# Patient Record
Sex: MALE | Race: WHITE | Employment: FULL TIME | URBAN - METROPOLITAN AREA
[De-identification: names, ages, dates, MRNs, and addresses within clinical notes are randomized per-mention and may not be internally consistent; named-entity substitution may affect disease eponyms.]

---

## 2019-05-24 RX ORDER — METHYLPREDNISOLONE 4 MG
4 TABLET, DOSE PACK ORAL SEE ADMIN INSTRUCTIONS
COMMUNITY

## 2019-05-24 RX ORDER — OXYCODONE AND ACETAMINOPHEN 5; 325 MG/1; MG/1
1 TABLET ORAL EVERY 6 HOURS PRN
COMMUNITY

## 2019-05-25 ENCOUNTER — ANESTHESIA EVENT (OUTPATIENT)
Dept: SURGERY | Facility: CLINIC | Age: 44
End: 2019-05-25
Payer: COMMERCIAL

## 2019-05-25 ENCOUNTER — HOSPITAL ENCOUNTER (OUTPATIENT)
Facility: CLINIC | Age: 44
Discharge: HOME | End: 2019-05-25
Attending: DENTIST | Admitting: DENTIST
Payer: COMMERCIAL

## 2019-05-25 ENCOUNTER — ANESTHESIA (OUTPATIENT)
Dept: SURGERY | Facility: CLINIC | Age: 44
End: 2019-05-25
Payer: COMMERCIAL

## 2019-05-25 VITALS
TEMPERATURE: 97.2 F | RESPIRATION RATE: 16 BRPM | WEIGHT: 138.67 LBS | BODY MASS INDEX: 19.85 KG/M2 | DIASTOLIC BLOOD PRESSURE: 103 MMHG | SYSTOLIC BLOOD PRESSURE: 154 MMHG | OXYGEN SATURATION: 100 % | HEART RATE: 50 BPM | HEIGHT: 70 IN

## 2019-05-25 PROCEDURE — 25000128 H RX IP 250 OP 636: Performed by: NURSE ANESTHETIST, CERTIFIED REGISTERED

## 2019-05-25 PROCEDURE — 37000009 ZZH ANESTHESIA TECHNICAL FEE, EACH ADDTL 15 MIN: Performed by: DENTIST

## 2019-05-25 PROCEDURE — 25000125 ZZHC RX 250: Performed by: NURSE ANESTHETIST, CERTIFIED REGISTERED

## 2019-05-25 PROCEDURE — 25800030 ZZH RX IP 258 OP 636: Performed by: NURSE ANESTHETIST, CERTIFIED REGISTERED

## 2019-05-25 PROCEDURE — 71000013 ZZH RECOVERY PHASE 1 LEVEL 1 EA ADDTL HR: Performed by: DENTIST

## 2019-05-25 PROCEDURE — 25000125 ZZHC RX 250: Performed by: DENTIST

## 2019-05-25 PROCEDURE — 25000132 ZZH RX MED GY IP 250 OP 250 PS 637: Performed by: DENTIST

## 2019-05-25 PROCEDURE — 25000566 ZZH SEVOFLURANE, EA 15 MIN: Performed by: DENTIST

## 2019-05-25 PROCEDURE — 71000027 ZZH RECOVERY PHASE 2 EACH 15 MINS: Performed by: DENTIST

## 2019-05-25 PROCEDURE — 71000012 ZZH RECOVERY PHASE 1 LEVEL 1 FIRST HR: Performed by: DENTIST

## 2019-05-25 PROCEDURE — 37000008 ZZH ANESTHESIA TECHNICAL FEE, 1ST 30 MIN: Performed by: DENTIST

## 2019-05-25 PROCEDURE — 40000169 ZZH STATISTIC PRE-PROCEDURE ASSESSMENT I: Performed by: DENTIST

## 2019-05-25 PROCEDURE — 36000093 ZZH SURGERY LEVEL 4 1ST 30 MIN: Performed by: DENTIST

## 2019-05-25 PROCEDURE — 25000128 H RX IP 250 OP 636: Performed by: DENTIST

## 2019-05-25 PROCEDURE — 36000063 ZZH SURGERY LEVEL 4 EA 15 ADDTL MIN: Performed by: DENTIST

## 2019-05-25 PROCEDURE — 27210794 ZZH OR GENERAL SUPPLY STERILE: Performed by: DENTIST

## 2019-05-25 PROCEDURE — 25000128 H RX IP 250 OP 636: Performed by: ANESTHESIOLOGY

## 2019-05-25 PROCEDURE — C1713 ANCHOR/SCREW BN/BN,TIS/BN: HCPCS | Performed by: DENTIST

## 2019-05-25 DEVICE — IMP SCR SYN CORTEX 2.0X06MM W/PLUSDRIVE TI 401.063: Type: IMPLANTABLE DEVICE | Site: MANDIBLE | Status: FUNCTIONAL

## 2019-05-25 RX ORDER — LIDOCAINE HYDROCHLORIDE 20 MG/ML
INJECTION, SOLUTION INFILTRATION; PERINEURAL PRN
Status: DISCONTINUED | OUTPATIENT
Start: 2019-05-25 | End: 2019-05-25

## 2019-05-25 RX ORDER — OXYCODONE HCL 5 MG/5 ML
5 SOLUTION, ORAL ORAL EVERY 4 HOURS PRN
Status: DISCONTINUED | OUTPATIENT
Start: 2019-05-25 | End: 2019-05-25 | Stop reason: HOSPADM

## 2019-05-25 RX ORDER — SODIUM CHLORIDE, SODIUM LACTATE, POTASSIUM CHLORIDE, CALCIUM CHLORIDE 600; 310; 30; 20 MG/100ML; MG/100ML; MG/100ML; MG/100ML
INJECTION, SOLUTION INTRAVENOUS CONTINUOUS
Status: DISCONTINUED | OUTPATIENT
Start: 2019-05-25 | End: 2019-05-25 | Stop reason: HOSPADM

## 2019-05-25 RX ORDER — NEOSTIGMINE METHYLSULFATE 1 MG/ML
VIAL (ML) INJECTION PRN
Status: DISCONTINUED | OUTPATIENT
Start: 2019-05-25 | End: 2019-05-25

## 2019-05-25 RX ORDER — FENTANYL CITRATE 50 UG/ML
25-50 INJECTION, SOLUTION INTRAMUSCULAR; INTRAVENOUS
Status: DISCONTINUED | OUTPATIENT
Start: 2019-05-25 | End: 2019-05-25 | Stop reason: HOSPADM

## 2019-05-25 RX ORDER — FENTANYL CITRATE 50 UG/ML
INJECTION, SOLUTION INTRAMUSCULAR; INTRAVENOUS PRN
Status: DISCONTINUED | OUTPATIENT
Start: 2019-05-25 | End: 2019-05-25

## 2019-05-25 RX ORDER — CEFAZOLIN SODIUM 1 G/3ML
1 INJECTION, POWDER, FOR SOLUTION INTRAMUSCULAR; INTRAVENOUS SEE ADMIN INSTRUCTIONS
Status: DISCONTINUED | OUTPATIENT
Start: 2019-05-25 | End: 2019-05-25 | Stop reason: HOSPADM

## 2019-05-25 RX ORDER — GLYCOPYRROLATE 0.2 MG/ML
INJECTION, SOLUTION INTRAMUSCULAR; INTRAVENOUS PRN
Status: DISCONTINUED | OUTPATIENT
Start: 2019-05-25 | End: 2019-05-25

## 2019-05-25 RX ORDER — ONDANSETRON 4 MG/1
4 TABLET, ORALLY DISINTEGRATING ORAL EVERY 30 MIN PRN
Status: DISCONTINUED | OUTPATIENT
Start: 2019-05-25 | End: 2019-05-25 | Stop reason: HOSPADM

## 2019-05-25 RX ORDER — SODIUM CHLORIDE, SODIUM LACTATE, POTASSIUM CHLORIDE, CALCIUM CHLORIDE 600; 310; 30; 20 MG/100ML; MG/100ML; MG/100ML; MG/100ML
INJECTION, SOLUTION INTRAVENOUS CONTINUOUS PRN
Status: DISCONTINUED | OUTPATIENT
Start: 2019-05-25 | End: 2019-05-25

## 2019-05-25 RX ORDER — ONDANSETRON 2 MG/ML
INJECTION INTRAMUSCULAR; INTRAVENOUS PRN
Status: DISCONTINUED | OUTPATIENT
Start: 2019-05-25 | End: 2019-05-25

## 2019-05-25 RX ORDER — DEXAMETHASONE SODIUM PHOSPHATE 4 MG/ML
INJECTION, SOLUTION INTRA-ARTICULAR; INTRALESIONAL; INTRAMUSCULAR; INTRAVENOUS; SOFT TISSUE PRN
Status: DISCONTINUED | OUTPATIENT
Start: 2019-05-25 | End: 2019-05-25

## 2019-05-25 RX ORDER — PROPOFOL 10 MG/ML
INJECTION, EMULSION INTRAVENOUS PRN
Status: DISCONTINUED | OUTPATIENT
Start: 2019-05-25 | End: 2019-05-25

## 2019-05-25 RX ORDER — VECURONIUM BROMIDE 1 MG/ML
INJECTION, POWDER, LYOPHILIZED, FOR SOLUTION INTRAVENOUS PRN
Status: DISCONTINUED | OUTPATIENT
Start: 2019-05-25 | End: 2019-05-25

## 2019-05-25 RX ORDER — CHLORHEXIDINE GLUCONATE ORAL RINSE 1.2 MG/ML
10 SOLUTION DENTAL ONCE
Status: COMPLETED | OUTPATIENT
Start: 2019-05-25 | End: 2019-05-25

## 2019-05-25 RX ORDER — OXYMETAZOLINE HYDROCHLORIDE 0.05 G/100ML
SPRAY NASAL PRN
Status: DISCONTINUED | OUTPATIENT
Start: 2019-05-25 | End: 2019-05-25

## 2019-05-25 RX ORDER — NALOXONE HYDROCHLORIDE 0.4 MG/ML
.1-.4 INJECTION, SOLUTION INTRAMUSCULAR; INTRAVENOUS; SUBCUTANEOUS
Status: DISCONTINUED | OUTPATIENT
Start: 2019-05-25 | End: 2019-05-25 | Stop reason: HOSPADM

## 2019-05-25 RX ORDER — HYDROMORPHONE HYDROCHLORIDE 1 MG/ML
.3-.5 INJECTION, SOLUTION INTRAMUSCULAR; INTRAVENOUS; SUBCUTANEOUS EVERY 5 MIN PRN
Status: DISCONTINUED | OUTPATIENT
Start: 2019-05-25 | End: 2019-05-25 | Stop reason: HOSPADM

## 2019-05-25 RX ORDER — CEFAZOLIN SODIUM 2 G/100ML
2 INJECTION, SOLUTION INTRAVENOUS
Status: COMPLETED | OUTPATIENT
Start: 2019-05-25 | End: 2019-05-25

## 2019-05-25 RX ORDER — BENZOCAINE/MENTHOL 6 MG-10 MG
LOZENGE MUCOUS MEMBRANE PRN
Status: DISCONTINUED | OUTPATIENT
Start: 2019-05-25 | End: 2019-05-25 | Stop reason: HOSPADM

## 2019-05-25 RX ORDER — BUPIVACAINE HYDROCHLORIDE AND EPINEPHRINE 2.5; 5 MG/ML; UG/ML
INJECTION, SOLUTION INFILTRATION; PERINEURAL PRN
Status: DISCONTINUED | OUTPATIENT
Start: 2019-05-25 | End: 2019-05-25 | Stop reason: HOSPADM

## 2019-05-25 RX ORDER — MAGNESIUM HYDROXIDE 1200 MG/15ML
LIQUID ORAL PRN
Status: DISCONTINUED | OUTPATIENT
Start: 2019-05-25 | End: 2019-05-25 | Stop reason: HOSPADM

## 2019-05-25 RX ORDER — ONDANSETRON 2 MG/ML
4 INJECTION INTRAMUSCULAR; INTRAVENOUS EVERY 30 MIN PRN
Status: DISCONTINUED | OUTPATIENT
Start: 2019-05-25 | End: 2019-05-25 | Stop reason: HOSPADM

## 2019-05-25 RX ADMIN — PROPOFOL 160 MG: 10 INJECTION, EMULSION INTRAVENOUS at 07:43

## 2019-05-25 RX ADMIN — DEXMEDETOMIDINE HYDROCHLORIDE 8 MCG: 100 INJECTION, SOLUTION INTRAVENOUS at 07:59

## 2019-05-25 RX ADMIN — CEFAZOLIN 1 G: 1 INJECTION, POWDER, FOR SOLUTION INTRAMUSCULAR; INTRAVENOUS at 09:50

## 2019-05-25 RX ADMIN — OXYMETAZOLINE HYDROCHLORIDE 2 SPRAY: 5 SPRAY NASAL at 07:40

## 2019-05-25 RX ADMIN — NEOSTIGMINE METHYLSULFATE 3 MG: 1 INJECTION, SOLUTION INTRAVENOUS at 10:04

## 2019-05-25 RX ADMIN — LIDOCAINE HYDROCHLORIDE 70 MG: 20 INJECTION, SOLUTION INFILTRATION; PERINEURAL at 07:43

## 2019-05-25 RX ADMIN — DEXAMETHASONE SODIUM PHOSPHATE 8 MG: 4 INJECTION, SOLUTION INTRA-ARTICULAR; INTRALESIONAL; INTRAMUSCULAR; INTRAVENOUS; SOFT TISSUE at 07:58

## 2019-05-25 RX ADMIN — OXYCODONE HYDROCHLORIDE 5 MG: 5 SOLUTION ORAL at 11:57

## 2019-05-25 RX ADMIN — CEFAZOLIN SODIUM 2 G: 2 INJECTION, SOLUTION INTRAVENOUS at 07:50

## 2019-05-25 RX ADMIN — DEXMEDETOMIDINE HYDROCHLORIDE 12 MCG: 100 INJECTION, SOLUTION INTRAVENOUS at 09:29

## 2019-05-25 RX ADMIN — MIDAZOLAM 2 MG: 1 INJECTION INTRAMUSCULAR; INTRAVENOUS at 07:40

## 2019-05-25 RX ADMIN — FENTANYL CITRATE 50 MCG: 50 INJECTION, SOLUTION INTRAMUSCULAR; INTRAVENOUS at 09:55

## 2019-05-25 RX ADMIN — ROCURONIUM BROMIDE 40 MG: 10 INJECTION INTRAVENOUS at 07:44

## 2019-05-25 RX ADMIN — SODIUM CHLORIDE, POTASSIUM CHLORIDE, SODIUM LACTATE AND CALCIUM CHLORIDE: 600; 310; 30; 20 INJECTION, SOLUTION INTRAVENOUS at 07:40

## 2019-05-25 RX ADMIN — GLYCOPYRROLATE 0.4 MG: 0.2 INJECTION, SOLUTION INTRAMUSCULAR; INTRAVENOUS at 10:04

## 2019-05-25 RX ADMIN — SODIUM CHLORIDE, POTASSIUM CHLORIDE, SODIUM LACTATE AND CALCIUM CHLORIDE: 600; 310; 30; 20 INJECTION, SOLUTION INTRAVENOUS at 08:53

## 2019-05-25 RX ADMIN — ROCURONIUM BROMIDE 10 MG: 10 INJECTION INTRAVENOUS at 08:00

## 2019-05-25 RX ADMIN — FENTANYL CITRATE 100 MCG: 50 INJECTION, SOLUTION INTRAMUSCULAR; INTRAVENOUS at 07:43

## 2019-05-25 RX ADMIN — VECURONIUM BROMIDE 2 MG: 1 INJECTION, POWDER, LYOPHILIZED, FOR SOLUTION INTRAVENOUS at 08:07

## 2019-05-25 RX ADMIN — ONDANSETRON 4 MG: 2 INJECTION INTRAMUSCULAR; INTRAVENOUS at 09:59

## 2019-05-25 RX ADMIN — FENTANYL CITRATE 50 MCG: 50 INJECTION, SOLUTION INTRAMUSCULAR; INTRAVENOUS at 09:09

## 2019-05-25 RX ADMIN — FENTANYL CITRATE 50 MCG: 50 INJECTION, SOLUTION INTRAMUSCULAR; INTRAVENOUS at 11:03

## 2019-05-25 RX ADMIN — FENTANYL CITRATE 50 MCG: 50 INJECTION, SOLUTION INTRAMUSCULAR; INTRAVENOUS at 11:34

## 2019-05-25 RX ADMIN — CHLORHEXIDINE GLUCONATE 15 ML: 1.2 RINSE ORAL at 06:42

## 2019-05-25 ASSESSMENT — MIFFLIN-ST. JEOR: SCORE: 1520.25

## 2019-05-25 NOTE — ANESTHESIA PREPROCEDURE EVALUATION
Anesthesia Pre-Procedure Evaluation    Patient: Mickey Ly   MRN: [id number] : 1975          Preoperative Diagnosis: RIGHT MANDIBLE OPEN FRACTURE    Procedure(s):  OPEN REDUCTION INTERNAL FIXATION, FRACTURE, RIGHT MANDIBLE (SYNTHES KIT)^  EXTRACTION, TOOTH #31    Past Medical History:   Diagnosis Date     Allergic rhinitis      GERD (gastroesophageal reflux disease)     NO MEDICATION     Loss of consciousness (H) 2019    fell of 6 foot ladder; also hurt shoulder, jaw, right sided     History reviewed. No pertinent surgical history.    Anesthesia Evaluation     . Pt has had prior anesthetic. Type: General    No history of anesthetic complications          ROS/MED HX    ENT/Pulmonary:  - neg pulmonary ROS     Neurologic:  - neg neurologic ROS     Cardiovascular:  - neg cardiovascular ROS   (+) hypertension----. : . . . :. .       METS/Exercise Tolerance:  >4 METS   Hematologic:         Musculoskeletal: Comment: Fell off ladder,Facial fracture        GI/Hepatic:  - neg GI/hepatic ROS       Renal/Genitourinary:      (-) renal disease   Endo:         Psychiatric:         Infectious Disease:         Malignancy:         Other:                          Physical Exam  Normal systems: dental    Airway   Mallampati: I  TM distance: >3 FB  Neck ROM: full    Dental     Cardiovascular   Rhythm and rate: regular and normal      Pulmonary    breath sounds clear to auscultation            No results found for: WBC, HGB, HCT, PLT, CRP, SED, NA, POTASSIUM, CHLORIDE, CO2, BUN, CR, GLC, SLADE, PHOS, MAG, ALBUMIN, PROTTOTAL, ALT, AST, GGT, ALKPHOS, BILITOTAL, BILIDIRECT, LIPASE, AMYLASE, DAGOBERTO, PTT, INR, FIBR, TSH, T4, T3, HCG, HCGS, CKTOTAL, CKMB, TROPN    Preop Vitals  BP Readings from Last 3 Encounters:   19 (!) 126/95    Pulse Readings from Last 3 Encounters:   19 60      Resp Readings from Last 3 Encounters:   19 16    SpO2 Readings from Last 3 Encounters:   19 100%      Temp Readings from  "Last 1 Encounters:   05/25/19 36.5  C (97.7  F) (Temporal)    Ht Readings from Last 1 Encounters:   05/25/19 1.778 m (5' 10\")      Wt Readings from Last 1 Encounters:   05/25/19 62.9 kg (138 lb 10.7 oz)    Estimated body mass index is 19.9 kg/m  as calculated from the following:    Height as of this encounter: 1.778 m (5' 10\").    Weight as of this encounter: 62.9 kg (138 lb 10.7 oz).       Anesthesia Plan      History & Physical Review  History and physical reviewed and following examination; no interval change.    ASA Status:  1 .    NPO Status:  > 8 hours    Plan for General, RSI and ETT with Intravenous and Propofol induction. Maintenance will be Balanced.    PONV prophylaxis:  Ondansetron (or other 5HT-3) and Dexamethasone or Solumedrol  Additional equipment: Videolaryngoscope      Postoperative Care  Postoperative pain management:  IV analgesics and Oral pain medications.      Consents  Anesthetic plan, risks, benefits and alternatives discussed with:  Patient..                 Ismael Beauchamp MD  "

## 2019-05-25 NOTE — ANESTHESIA POSTPROCEDURE EVALUATION
Patient: Mickey Ly    Procedure(s):  OPEN REDUCTION INTERNAL FIXATION RIGHT MANDIBLE, EXTRACTION TOOTH # 31,APPLICATION OF MAXILLARY AND MANDIBULAR ARCH BARS  EXTRACTION, TOOTH #31    Diagnosis:RIGHT MANDIBLE OPEN FRACTURE  Diagnosis Additional Information: No value filed.    Anesthesia Type:  General, RSI, ETT    Note:  Anesthesia Post Evaluation    Patient location during evaluation: Bedside  Patient participation: Able to fully participate in evaluation  Level of consciousness: awake and alert  Pain management: adequate  Airway patency: patent  Cardiovascular status: acceptable  Respiratory status: acceptable  Hydration status: acceptable  PONV: none             Last vitals:  Vitals:    05/25/19 1115 05/25/19 1130 05/25/19 1145   BP: (!) 159/99 (!) 160/103 (!) 154/103   Pulse:  50    Resp: 12 11 16   Temp:      SpO2: 96% 99% 100%         Electronically Signed By: Malcom Ramírez MD  May 25, 2019  3:44 PM

## 2019-05-25 NOTE — PRE-PROCEDURE
Oral and Maxillofacial Surgery  Pre-Op Note    OR Date: 5/25/2019    Patient Name: Mickey Ly [id number]   Pre-op History and Physical reviewed.  Pre-Op Diagnosis:   1. Right mandibular body fracture  2. Fractured tooth #31    Planned Procedure:   1. Placement of maxillary and mandibular arch bars  2. Possible open reduction and internal fixation of mandible fracture  3. Extraction of tooth #31    Anesthesia Type: GA via nasal intubation  Allergies: NKDA  Labs: per anesthesia service    Surgeon:   Nader Banks DDS      Consent:  Discussion included risks/benefits/complications including but not limited post-operative pain, bleeding, swelling, infection, hardware failure/malunion, dehiscence of wounds, temporary/permanent paresthesia/anesthesia of CN V3 mental nerve distribution/CN VII (motor to muscles of facial expression), failure to resolve chief complaint, or need for additional procedures. Alternatives discussed.  Patient agrees to procedure. To obtained written and signed consent day of surgery.  Nader Banks DDS

## 2019-05-25 NOTE — BRIEF OP NOTE
[facility]    Brief Operative Note    Pre-operative diagnosis: RIGHT MANDIBLE OPEN FRACTURE  Post-operative diagnosis RIGHT MANDIBLE OPEN FRACTURE  Procedure: Procedure(s):  OPEN REDUCTION INTERNAL FIXATION RIGHT MANDIBLE, EXTRACTION TOOTH # 31,APPLICATION OF MAXILLARY AND MANDIBULAR ARCH BARS  EXTRACTION, TOOTH #31  Surgeon: Surgeon(s) and Role:     * Nader Banks DDS - Primary  Anesthesia: General   Estimated blood loss: 20cc  Drains: None  Specimens: * No specimens in log *  Findings:   fractured tooth #31 and open right mandibular body fracture.  Complications: None.  Implants:    Implant Name Type Inv. Item Serial No.  Lot No. LRB No. Used   8 hole plate     AUTOCLAVE [id number] 42 06  1   IMP SCR SYN CORTEX 2.0X06MM W/PLUSDRIVE .063 [employer] IMP SCR SYN CORTEX 2.0X06MM W/PLUSDRIVE .063  [employer] AUTOCLAVE [id number] 42 06  8   IMP SCR SYN CORTEX 2.0X06MM W/PLUSDRIVE .063 Metallic [employer]ware/[employer] IMP SCR SYN CORTEX 2.0X06MM W/PLUSDRIVE .063  [employer] AUT[employer]AVE [id number] 42 06  1

## 2019-05-25 NOTE — OP NOTE
Procedure Date: 05/25/2019      PREOPERATIVE DIAGNOSES:   1.  Right mandibular body fracture.   2.  Traumatic malocclusion.   3.  Fractured tooth #31.      POSTOPERATIVE DIAGNOSES:   1.  Right mandibular body fracture.   2.  Traumatic malocclusion.   3.  Fractured tooth #31.      PROCEDURES PERFORMED:   1.  Application of maxillary and mandibular arch bars.   2.  Surgical extraction of tooth #31.   3.  Open reduction and internal fixation of the right mandibular body fracture.      ESTIMATED BLOOD LOSS:  20 mL.      FLUIDS:  See anesthesia report.      LOCAL ANESTHESIA:  10 mL of 2% lidocaine with 1:100,000 epinephrine and 5 mL of 0.25% Marcaine with 1:200,000 epinephrine.      DRAINS:  None.      SPECIMENS:  None.      COMPLICATIONS:  None.      INDICATIONS FOR PROCEDURE:  Mickey Ly is an otherwise healthy 43-year-old man who was seen in our outpatient clinic on 05/23/2019 after sustaining a fall from a ladder on 05/21/2019.  This resulted in a right mandibular body fracture as well as fracture of tooth #31 as well as traumatic malocclusion.  After evaluation of the patient clinically and radiographically, the patient was noted to have a grossly displaced right mandibular body fracture, traumatic malocclusion and a fracture nonrestorable tooth #31.  It was recommended the patient be taken to the operating room under general anesthesia for application of maxillary mandibular arch bars, extraction of tooth #31 as well as open reduction internal fixation of the right body fracture.  The risks and benefits of the procedure were thoroughly discussed with the patient and the patient's significant other.  The risks and benefits including but not limited to pain, swelling, infection, need for additional procedures in the future as well as damage to adjacent teeth, damage to adjacent soft tissue and damage to the right inferior alveolar nerve resulting in persistent or temporary paresthesia, anesthesia or dysesthesia.  It  should be noted that the patient did report significant paresthesia of the right V3 distribution preoperatively.  Additional risks include malocclusion, persistent malocclusion, need for additional dental procedures, malunion, fibrous union, and need for additional open reduction internal fixation.  Additionally, we discussed that the potential for a transcutaneous approach placing the marginal mandibular nerve branch at risk, resulting in cranial nerve VII nerve injury resulting in temporary or permanent facial weakness to the right lower lip.  After the risks and benefits were thoroughly discussed with the patient, the patient signed written and verbal consent.      DESCRIPTION OF PROCEDURE:  The patient was met in the preoperative holding area on the day of the surgery.  The risks and benefits were discussed again.  Written and verbal consent was obtained.  The site was marked on the right parasymphysis region.  The patient was then escorted to operating room 32 by the Anesthesia Service.  The patient was placed in supine position.  All standard ASA monitors were applied.  The patient was appropriately padded.  The patient then underwent a smooth IV induction and then was subsequently intubated in the left naris via nasal endotracheal tube in one attempt without complication.  Anesthesia confirmed tube placement by auscultation of the lungs, CO2 monitoring, and presence of fog in the tube by the anesthesia service.  The tube was then fixated and placed in a safe position while placing no pressure on the left naris by the Anesthesia Service.  The head and neck was then prepped to create a sterile field from the infraorbital rims to the supraclavicular region.  The patient was then draped in a sterile fashion to create a sterile field by the scrub tech.  The oral surgeon then left the operating room to perform a sterile scrub and left the patient under the care of the Anesthesia Service.  The oral surgeon then  returned to the operating room to don sterile gloves.  A timeout was then conducted per [facility] preoperative surgical protocol.  A bite block was then placed and then a throat pack was then placed to protect the posterior oropharynx.  First, the maxillary arch bar was then placed, cut and trimmed to adapt from tooth #3 to tooth #14.  Then, with a combination of 24- and 26-gauge wires, the arch bar was then fit and adapted around the teeth.  The wires were then twisted and rosetted and trimmed.  Then the mandibular arch bar was then cut and trimmed and adapted to span teeth #19 through 30.  Then, with a combination of 24- and 26-gauge wires, the arch bar was then fit and adapted and the wires were then twisted, rosetted and cut.  The maxillary mandibular arch bars were noted to be stable.  Attention was then turned to the fractured tooth #31.  The tooth was then gently elevated and noted to be nonmobile.  A surgical handpiece was then used with a 700 bur under copious irrigation to create a thin buccal trough around the buccal aspect of the tooth.  The crown and the distal root were then elevated and removed.  The mesial root was then removed with a 700 bur under copious irrigation to create a buccal trough and then was elevated and removed.  The gross body fracture was then addressed.  A sulcular incision as well as a distal buccal releasing incision distal to tooth #31 was then completed with a #15 blade.  A #9 periosteal elevator was then used to reflect a buccal full thickness mucoperiosteal flap, dissecting to the inferior border of the mandible distal to tooth #31 and just posterior to tooth #29.  No visualization of the mental nerve branch was encountered.  No visualization of the inferior alveolar nerve encountered.  The right mandibular body fracture was then fully visualized and noted to be grossly mobile.  A small crestal buccal bony sequester was noted and removed.  Then, with a combination of  a pickle fork, the mandible was then manipulated and noted to come into appropriate anatomic reduction.  Then, the previously placed bite block was then removed.  The patient was then manipulated into maximum intercuspation and then was intraoperatively placed in maxillomandibular fixation with 18-gauge stainless steel wires.  The occlusion was noted to be stable with maxillary mandibular midlines coincident with no anterior or posterior open bite.  The anatomic reduction of the right mandibular body fracture was then assessed.  The proximal and distal segments were noted to be in good position.  Then, an 8-hole Synthes 1.0 mm ladder plate was then placed along the lateral border of the right mandible.  Mild bending of the anterior portion of the plate was necessary to create flush adaptation to the lateral body of the mandible.  Then, a #15 blade was used to create approximately 1 cm transcutaneous incision approximately 2 cm below the inferior border of the mandible adjacent to the mandibular body fracture.  Blunt dissection with a mosquito was then performed through the underlying subcutaneous tissue, platysma and a trocar was then placed to the tissue and perforation through into the underlying surgical site was completed.  The plate was then reevaluated and held in place.  Then, with a combination of the trocar as well as a surgical handpiece under copious irrigation, an initial osteotomy was performed in 2 plate hole sites.  Then, through the trocar, two 6 mm Synthes screws were then placed along the lateral buccal cortex.  Sufficient buccal bone was noted to be present so as to mitigate any potential risk to the adjacent teeth or inferior alveolar nerve  The plate was noted to be in adequate position.  Then, the additional remaining 6 holes were then predrilled to accommodate additional 6 mm screws under copious sterile saline irrigation.  Then, with a combination of the trocar, additional six additional 6 mm  screws were then fixated for a total of eight 6 mm nonlocking screws.  All screws were noted to be stable without any mobility or stripping.  The plate was noted to be flush and well adapted to the lateral border of the mandible.  The inferior border of the mandible as well as the superior border of the mandible were noted to be well adapted and flush without any gross bony step-offs.  The subperiosteal dissection site as well as the extraction site was then copiously irrigated with sterile saline.  The previously placed 26-gauge wires were then cut.  The mandible was noted to be intact without any gross mobility of the anterior and proximal segments.  The occlusion was then reevaluated and noted to be repeatedly stable without any significant changes.  The surgical site intraorally was then reapproximated with 3-0 Vicryl suture in interrupted fashion.  The previously placed throat pack was then removed.  The patient was then placed in maxillomandibular fixation and then held in place with heavy intra-elastic bands.  The previous transcutaneous incision was then closed with two 5-0 fast gut sutures.  The procedure was then completed at this time.  Occlusion remained stable.  The patient was then extubated without complication.  The patient was then noted to be breathing on his own without complication. All counts were correct x 2.  The patient was then transferred to PACU for continued routine cares.  The patient to be discharged from PACU today under the care of a domestic partner.         SHAILESH SU DDS             D: 2019   T: 2019   MT:       Name:     ABDULLAHI GANDARA   MRN:      [id number]        Account:        [id number]   :      1975           Procedure Date: 2019      Document: [id number]

## 2019-05-25 NOTE — DISCHARGE INSTRUCTIONS
Same Day Surgery Discharge Instructions for  Sedation and General Anesthesia       It's not unusual to feel dizzy, light-headed or faint for up to 24 hours after surgery or while taking pain medication.  If you have these symptoms: sit for a few minutes before standing and have someone assist you when you get up to walk or use the bathroom.      You should rest and relax for the next 24 hours. We recommend you make arrangements to have an adult stay with you for at least 24 hours after your discharge.  Avoid hazardous and strenuous activity.      DO NOT DRIVE any vehicle or operate mechanical equipment for 24 hours following the end of your surgery.  Even though you may feel normal, your reactions may be affected by the medication you have received.      Do not drink alcoholic beverages for 24 hours following surgery.       Slowly progress to your regular diet as you feel able. It's not unusual to feel nauseated and/or vomit after receiving anesthesia.  If you develop these symptoms, drink clear liquids (apple juice, ginger ale, broth, 7-up, etc. ) until you feel better.  If your nausea and vomiting persists for 24 hours, please notify your surgeon.        All narcotic pain medications, along with inactivity and anesthesia, can cause constipation. Drinking plenty of liquids and increasing fiber intake will help.      For any questions of a medical nature, call your surgeon.      Do not make important decisions for 24 hours.      If you had general anesthesia, you may have a sore throat for a couple of days related to the breathing tube used during surgery.  You may use Cepacol lozenges to help with this discomfort.  If it worsens or if you develop a fever, contact your surgeon.       If you feel your pain is not well managed with the pain medications prescribed by your surgeon, please contact your surgeon's office to let them know so they can address your concerns.           **If you have questions or concerns about  your procedure  call Dr. Banks at [phone]**

## (undated) DEVICE — GLOVE PROTEXIS W/NEU-THERA 8.0  2D73TE80

## (undated) DEVICE — BUR DENTAL 1.75X75MM STRAIGHT 560

## (undated) DEVICE — SOL WATER IRRIG 1000ML BOTTLE 2F7114

## (undated) DEVICE — ESU GROUND PAD UNIVERSAL W/O CORD

## (undated) DEVICE — GLOVE PROTEXIS POWDER FREE 8.0 ORTHOPEDIC 2D73ET80

## (undated) DEVICE — SPONGE PACK VAGINAL 2X36"

## (undated) DEVICE — GLOVE PROTEXIS W/NEU-THERA 7.5  2D73TE75

## (undated) DEVICE — GLOVE PROTEXIS W/NEU-THERA 6.5  2D73TE65

## (undated) DEVICE — LINEN TOWEL PACK X5 5464

## (undated) DEVICE — MANIFOLD NEPTUNE 4 PORT 700-20

## (undated) DEVICE — BLADE KNIFE SURG 15 371115

## (undated) DEVICE — DRILL BIT SYN 1.5X44.5 W/6MM STOP J LATCH 317.66

## (undated) DEVICE — ESU NDL COLORADO MICRO 3CM STR N103A

## (undated) DEVICE — GOWN LG DISP 9515

## (undated) DEVICE — DRILL BIT SYN 1.5X125MM W/08MM STOP J-LATCH

## (undated) DEVICE — SOL NACL 0.9% IRRIG 1000ML BOTTLE 2F7124

## (undated) DEVICE — GOWN IMPERVIOUS BREATHABLE SMART LG 89015

## (undated) DEVICE — GLOVE PROTEXIS MICRO 6.5  2D73PM65

## (undated) DEVICE — PACK HEAD NECK SEN15HNFSF

## (undated) RX ORDER — CEFAZOLIN SODIUM 1 G/3ML
INJECTION, POWDER, FOR SOLUTION INTRAMUSCULAR; INTRAVENOUS
Status: DISPENSED
Start: 2019-05-25

## (undated) RX ORDER — DEXAMETHASONE SODIUM PHOSPHATE 4 MG/ML
INJECTION, SOLUTION INTRA-ARTICULAR; INTRALESIONAL; INTRAMUSCULAR; INTRAVENOUS; SOFT TISSUE
Status: DISPENSED
Start: 2019-05-25

## (undated) RX ORDER — LIDOCAINE HYDROCHLORIDE 20 MG/ML
INJECTION, SOLUTION EPIDURAL; INFILTRATION; INTRACAUDAL; PERINEURAL
Status: DISPENSED
Start: 2019-05-25

## (undated) RX ORDER — VECURONIUM BROMIDE 1 MG/ML
INJECTION, POWDER, LYOPHILIZED, FOR SOLUTION INTRAVENOUS
Status: DISPENSED
Start: 2019-05-25

## (undated) RX ORDER — CHLORHEXIDINE GLUCONATE ORAL RINSE 1.2 MG/ML
SOLUTION DENTAL
Status: DISPENSED
Start: 2019-05-25

## (undated) RX ORDER — BUPIVACAINE HYDROCHLORIDE AND EPINEPHRINE 2.5; 5 MG/ML; UG/ML
INJECTION, SOLUTION EPIDURAL; INFILTRATION; INTRACAUDAL; PERINEURAL
Status: DISPENSED
Start: 2019-05-25

## (undated) RX ORDER — PROPOFOL 10 MG/ML
INJECTION, EMULSION INTRAVENOUS
Status: DISPENSED
Start: 2019-05-25

## (undated) RX ORDER — NEOSTIGMINE METHYLSULFATE 1 MG/ML
VIAL (ML) INJECTION
Status: DISPENSED
Start: 2019-05-25

## (undated) RX ORDER — GLYCOPYRROLATE 0.2 MG/ML
INJECTION, SOLUTION INTRAMUSCULAR; INTRAVENOUS
Status: DISPENSED
Start: 2019-05-25

## (undated) RX ORDER — FENTANYL CITRATE 50 UG/ML
INJECTION, SOLUTION INTRAMUSCULAR; INTRAVENOUS
Status: DISPENSED
Start: 2019-05-25

## (undated) RX ORDER — OXYCODONE HCL 5 MG/5 ML
SOLUTION, ORAL ORAL
Status: DISPENSED
Start: 2019-05-25

## (undated) RX ORDER — LIDOCAINE HYDROCHLORIDE AND EPINEPHRINE BITARTRATE 20; .01 MG/ML; MG/ML
INJECTION, SOLUTION SUBCUTANEOUS
Status: DISPENSED
Start: 2019-05-25

## (undated) RX ORDER — CEFAZOLIN SODIUM 2 G/100ML
INJECTION, SOLUTION INTRAVENOUS
Status: DISPENSED
Start: 2019-05-25

## (undated) RX ORDER — ONDANSETRON 2 MG/ML
INJECTION INTRAMUSCULAR; INTRAVENOUS
Status: DISPENSED
Start: 2019-05-25

## (undated) RX ORDER — BENZOCAINE/MENTHOL 6 MG-10 MG
LOZENGE MUCOUS MEMBRANE
Status: DISPENSED
Start: 2019-05-25